# Patient Record
Sex: FEMALE | Race: BLACK OR AFRICAN AMERICAN | NOT HISPANIC OR LATINO | Employment: FULL TIME | URBAN - METROPOLITAN AREA
[De-identification: names, ages, dates, MRNs, and addresses within clinical notes are randomized per-mention and may not be internally consistent; named-entity substitution may affect disease eponyms.]

---

## 2016-08-23 LAB — PAP-ABSTRACT: NORMAL

## 2017-03-31 ENCOUNTER — HOME CARE/HOSPICE - HEALTHEAST (OUTPATIENT)
Dept: HOME HEALTH SERVICES | Facility: HOME HEALTH | Age: 22
End: 2017-03-31

## 2017-05-09 ENCOUNTER — OFFICE VISIT (OUTPATIENT)
Dept: OBGYN | Facility: OTHER | Age: 22
End: 2017-05-09
Payer: COMMERCIAL

## 2017-05-09 VITALS — WEIGHT: 117.5 LBS | SYSTOLIC BLOOD PRESSURE: 120 MMHG | HEART RATE: 72 BPM | DIASTOLIC BLOOD PRESSURE: 88 MMHG

## 2017-05-09 DIAGNOSIS — Z20.2 EXPOSURE TO STD: ICD-10-CM

## 2017-05-09 DIAGNOSIS — Z30.011 ENCOUNTER FOR INITIAL PRESCRIPTION OF CONTRACEPTIVE PILLS: ICD-10-CM

## 2017-05-09 DIAGNOSIS — Z30.012 EMERGENCY CONTRACEPTION: ICD-10-CM

## 2017-05-09 RX ORDER — AZITHROMYCIN 500 MG/1
1000 TABLET, FILM COATED ORAL ONCE
Qty: 2 TABLET | Refills: 0 | Status: SHIPPED | OUTPATIENT
Start: 2017-05-09 | End: 2017-05-09

## 2017-05-09 RX ORDER — NORETHINDRONE ACETATE AND ETHINYL ESTRADIOL 1MG-20(21)
1 KIT ORAL DAILY
Qty: 84 TABLET | Refills: 3 | Status: SHIPPED | OUTPATIENT
Start: 2017-05-09 | End: 2017-10-05

## 2017-05-09 RX ORDER — LEVONORGESTREL 1.5 MG/1
1.5 TABLET ORAL ONCE
Qty: 1 TABLET | Refills: 1 | Status: SHIPPED | OUTPATIENT
Start: 2017-05-09 | End: 2017-10-05

## 2017-05-09 RX ORDER — PRENATAL VIT/IRON FUM/FOLIC AC 27MG-0.8MG
1 TABLET ORAL DAILY
COMMUNITY

## 2017-05-09 NOTE — NURSING NOTE
Chief Complaint   Patient presents with     Post Partum Exam     patient would like Plan B and STD testing       Initial /88 (BP Location: Right arm, Patient Position: Chair, Cuff Size: Adult Regular)  Pulse 72  Wt 117 lb 8 oz (53.3 kg)  Breastfeeding? Yes There is no height or weight on file to calculate BMI.  Medication Reconciliation: complete   Yoli Richard CMA

## 2017-05-09 NOTE — PROGRESS NOTES
SUBJECTIVE: Cole Pina  is here for a 6-week postpartum checkup.  Had intercourse with out a condom with someone who is not her partner last night.  Worried about STI and pregnancy  Patient Active Problem List   Diagnosis     Encounter for initial prescription of contraceptive pills     Past Medical History:   Diagnosis Date     Anemia      Current Outpatient Prescriptions   Medication Sig Dispense Refill     Prenatal Vit-Fe Fumarate-FA (PRENATAL MULTIVITAMIN  PLUS IRON) 27-0.8 MG TABS per tablet Take 1 tablet by mouth daily       norethindrone-ethinyl estradiol (JUNEL FE 1/20) 1-20 MG-MCG per tablet Take 1 tablet by mouth daily 84 tablet 3     levonorgestrel (PLAN B ONE-STEP) 1.5 MG TABS tablet Take 1 tablet (1.5 mg) by mouth once for 1 dose 1 tablet 1     azithromycin (ZITHROMAX) 500 MG tablet Take 2 tablets (1,000 mg) by mouth once for 1 dose 2 tablet 0     ROS:  Delivery date was 3/30/2017. She had a  of a viable girl, weight 6 pounds 11 oz., with no complications.   She is  breast feeding and formula without problems  and no signs of mastitis. Bleeding  has stopped and there are no signs or symptoms of endometritis.  Patient  is screened for postpartum depression and is  negative. Last PHQ-9 score on record= 0  Denies stress incontinence.    No constipation, pain or bleeding with bowel movements.       EXAM:  Neck:  Euthyroid without masses or nodules.  Heart:  RRR without murmur.  Lungs: Clear to auscultation.  Abd: Without masses or tenderness, no organomegaly, diastisis 1 fingerbreadth.  Uterus not palpable  Adnexa with out masses or tenderness.   Rectum:  Deferred      ASSESSMENT:   Normal postpartum exam.    PLAN:  (Z39.2) Routine postpartum follow-up  (primary encounter diagnosis)  Comment:   Plan:     (Z30.011) Encounter for initial prescription of contraceptive pills  Comment:   Plan: norethindrone-ethinyl estradiol (JUNEL FE 1/20)        1-20 MG-MCG per tablet   Has used pills before and denies  contraindications         (Z30.9) Emergency contraception  Comment:   Plan: levonorgestrel (PLAN B ONE-STEP) 1.5 MG TABS         tablet            (Z20.2) Exposure to STD  Comment:   Plan: azithromycin (ZITHROMAX) 500 MG tablet  Recommend to return for testing in 2-3 months            Encouraged  50 Kegals a day 25 fast and 25 slow release.   Signs, symptoms and preventative measures for mastitis were discussed and the patient will call with aches, fever of 101 or higher and flu like symptoms.  Signs and symptoms of post partum depression were discussed and she will contact this office or other health care provider as appropriate.  Contraception is LISA

## 2017-05-09 NOTE — MR AVS SNAPSHOT
After Visit Summary   5/9/2017    Cole Pina    MRN: [id number]           Patient Information     Date Of Birth          1995        Visit Information        Provider Department      5/9/2017 1:30 PM Mary Martinez APRN CNM [facility]        Today's Diagnoses     Routine postpartum follow-up    -  1    Encounter for initial prescription of contraceptive pills        Emergency contraception        Exposure to STD           Follow-ups after your visit        Who to contact     If you have questions or need follow up information about today's clinic visit or your schedule please contact [facility] directly at [phone].  Normal or non-critical lab and imaging results will be communicated to you by MyChart, letter or phone within 4 business days after the clinic has received the results. If you do not hear from us within 7 days, please contact the clinic through [employer] or phone. If you have a critical or abnormal lab result, we will notify you by phone as soon as possible.  Submit refill requests through [employer] or call your pharmacy and they will forward the refill request to us. Please allow 3 business days for your refill to be completed.          Additional Information About Your Visit        MyChart Information     Opt[employer] gives you secure access to your electronic health record. If you see a primary care provider, you can also send messages to your care team and make appointments. If you have questions, please call your primary care clinic.  If you do not have a primary care provider, please call [phone] and they will assist you.        Care EveryWhere ID     This is your Care EveryWhere ID. This could be used by other organizations to access your [facility] medical records  [id number]        Your Vitals Were     Pulse Breastfeeding?                72 Yes           Blood Pressure from Last 3 Encounters:   05/09/17 120/88    Weight from Last 3  Encounters:   05/09/17 117 lb 8 oz (53.3 kg)              Today, you had the following     No orders found for display         Today's Medication Changes          These changes are accurate as of: 5/9/17  2:13 PM.  If you have any questions, ask your nurse or doctor.               Start taking these medicines.        Dose/Directions    azithromycin 500 MG tablet   Commonly known as:  ZITHROMAX   Used for:  Exposure to STD   Started by:  Mary Martinez APRN CNM        Dose:  1000 mg   Take 2 tablets (1,000 mg) by mouth once for 1 dose   Quantity:  2 tablet   Refills:  0       levonorgestrel 1.5 MG Tabs tablet   Commonly known as:  PLAN B ONE-STEP   Used for:  Emergency contraception   Started by:  Mary Martinez APRN CNM        Dose:  1.5 mg   Take 1 tablet (1.5 mg) by mouth once for 1 dose   Quantity:  1 tablet   Refills:  1       norethindrone-ethinyl estradiol 1-20 MG-MCG per tablet   Commonly known as:  JUNEL FE 1/20   Used for:  Encounter for initial prescription of contraceptive pills   Started by:  Mary Martinez APRN CNM        Dose:  1 tablet   Take 1 tablet by mouth daily   Quantity:  84 tablet   Refills:  3            Where to get your medicines      These medications were sent to [facility] [address] [facility] [address] [facility] AT [facility] [address] [facility] [address]  [address] [facility] [address]     Phone:  [phone]     azithromycin 500 MG tablet    levonorgestrel 1.5 MG Tabs tablet    norethindrone-ethinyl estradiol 1-20 MG-MCG per tablet                Primary Care Provider    None Specified       No primary provider on file.        Thank you!     Thank you for choosing [facility]  for your care. Our goal is always to provide you with excellent care. Hearing back from our patients is one way we can continue to improve our services. Please take a few minutes to complete the written survey that you may receive in the mail after your visit with us. Thank  you!             Your Updated Medication List - Protect others around you: Learn how to safely use, store and throw away your medicines at www.disposemymeds.org.          This list is accurate as of: 5/9/17  2:13 PM.  Always use your most recent med list.                   Brand Name Dispense Instructions for use    azithromycin 500 MG tablet    ZITHROMAX    2 tablet    Take 2 tablets (1,000 mg) by mouth once for 1 dose       levonorgestrel 1.5 MG Tabs tablet    PLAN B ONE-STEP    1 tablet    Take 1 tablet (1.5 mg) by mouth once for 1 dose       norethindrone-ethinyl estradiol 1-20 MG-MCG per tablet    JUNEL FE 1/20    84 tablet    Take 1 tablet by mouth daily       prenatal multivitamin  plus iron 27-0.8 MG Tabs per tablet      Take 1 tablet by mouth daily

## 2017-05-10 ASSESSMENT — PATIENT HEALTH QUESTIONNAIRE - PHQ9: SUM OF ALL RESPONSES TO PHQ QUESTIONS 1-9: 0

## 2017-05-24 ENCOUNTER — TELEPHONE (OUTPATIENT)
Dept: OBGYN | Facility: OTHER | Age: 22
End: 2017-05-24

## 2017-05-24 NOTE — TELEPHONE ENCOUNTER
Cole Pina is a 22 year old female who calls with vaginal bleeding, recently stated BC pill.    NURSING ASSESSMENT:  Description:  ***  Onset/duration:  ***  Pain scale (0-10)   { :356126}  LMP/preg/breast feeding:  ***  Last exam/Treatment:  ***  Allergies: No Known Allergies    NURSING PLAN: { :517037}    RECOMMENDED DISPOSITION:  { :623528}  Will comply with recommendation: { :422645}  If further questions/concerns or if symptoms do not improve, worsen or new symptoms develop, call your PCP or [facility] as soon as possible.    NOTES:  Disposition was determined by the first positive assessment question, therefore all previous assessment questions were negative    Guideline used:  { :304368}    Lizz Georges RN, BSN

## 2017-05-24 NOTE — TELEPHONE ENCOUNTER
Started birth control pill and now after two days got period again. For last 2 weeks  Every 3 hours would change pad/tampon. Having some cramps.  No dizziness, no headache.,  Breastfeeding 2x daily.    Advised she follow up in clinic.    Appointment made for next available for patient.  Advised her if bleeding worsens or weakness /dizzines develops she needs to be seen in ED.  She agreed to plan.    RECOMMENDED DISPOSITION:  See in 24 hours - unable to be seen today or tomorrow does not have car.  Will comply with recommendation: no -    If further questions/concerns or if Sx do not improve, worsen or new Sx develop, call your PCP or [facility] as soon as possible.    NOTES:  Disposition was determined by the first positive assessment question, therefore all previous assessment questions were negative.     Guideline used:abnormal bleeding in women of reproductive age  Telephone Triage for Obstetrics and Gynecology, Urszula Willingham and Malka Day RN

## 2017-05-24 NOTE — TELEPHONE ENCOUNTER
Reason for call:  Symptom  Reason for call:  Patient reporting a symptom    Symptom or request: period    Duration (how long have symptoms been present): 2 weeks    Have you been treated for this before? No    Additional comments: patient started on pill at post partum appt and after two weeks started to have her period prior to the end of the month as told. Patient would like to discuss     Phone Number patient can be reached at:  Other phone number:  [phone]    Best Time:  Before 3 pm, after 3 leave a detailed message    Can we leave a detailed message on this number:  YES    Call taken on 5/24/2017 at 12:24 PM by Radha Yun

## 2017-05-30 ENCOUNTER — OFFICE VISIT (OUTPATIENT)
Dept: OBGYN | Facility: OTHER | Age: 22
End: 2017-05-30
Payer: COMMERCIAL

## 2017-05-30 VITALS — HEART RATE: 68 BPM | WEIGHT: 116.5 LBS | SYSTOLIC BLOOD PRESSURE: 102 MMHG | DIASTOLIC BLOOD PRESSURE: 70 MMHG

## 2017-05-30 DIAGNOSIS — N92.0 MENORRHAGIA WITH REGULAR CYCLE: Primary | ICD-10-CM

## 2017-05-30 PROCEDURE — 99213 OFFICE O/P EST LOW 20 MIN: CPT | Performed by: ADVANCED PRACTICE MIDWIFE

## 2017-05-30 NOTE — MR AVS SNAPSHOT
After Visit Summary   5/30/2017    Cole Pina    MRN: [id number]           Patient Information     Date Of Birth          1995        Visit Information        Provider Department      5/30/2017 1:00 PM Mary Martinez APRN CNM [facility]        Today's Diagnoses     Menorrhagia with regular cycle    -  1       Follow-ups after your visit        Follow-up notes from your care team     Return if symptoms worsen or fail to improve.      Who to contact     If you have questions or need follow up information about today's clinic visit or your schedule please contact [facility] directly at [phone].  Normal or non-critical lab and imaging results will be communicated to you by dentalDoctorshart, letter or phone within 4 business days after the clinic has received the results. If you do not hear from us within 7 days, please contact the clinic through dentalDoctorshart or phone. If you have a critical or abnormal lab result, we will notify you by phone as soon as possible.  Submit refill requests through [email or web address] or call your pharmacy and they will forward the refill request to us. Please allow 3 business days for your refill to be completed.          Additional Information About Your Visit        MyChart Information     [email or web address] gives you secure access to your electronic health record. If you see a primary care provider, you can also send messages to your care team and make appointments. If you have questions, please call your primary care clinic.  If you do not have a primary care provider, please call [phone] and they will assist you.        Care EveryWhere ID     This is your Care EveryWhere ID. This could be used by other organizations to access your [facility] medical records  [id number]        Your Vitals Were     Pulse Last Period Breastfeeding?             68 05/11/2017 (Exact Date) Yes          Blood Pressure from Last 3 Encounters:   05/30/17 102/70   05/09/17 120/88     Weight from Last 3 Encounters:   05/30/17 116 lb 8 oz (52.8 kg)   05/09/17 117 lb 8 oz (53.3 kg)              We Performed the Following     ABSTRACT PAP-NO CHARGE        Primary Care Provider    Fvl None       No address on file        Thank you!     Thank you for choosing [facility]  for your care. Our goal is always to provide you with excellent care. Hearing back from our patients is one way we can continue to improve our services. Please take a few minutes to complete the written survey that you may receive in the mail after your visit with us. Thank you!             Your Updated Medication List - Protect others around you: Learn how to safely use, store and throw away your medicines at www.disposemymeds.org.          This list is accurate as of: 5/30/17  2:00 PM.  Always use your most recent med list.                   Brand Name Dispense Instructions for use    levonorgestrel 1.5 MG Tabs tablet    PLAN B ONE-STEP    1 tablet    Take 1 tablet (1.5 mg) by mouth once for 1 dose       norethindrone-ethinyl estradiol 1-20 MG-MCG per tablet    JUNEL FE 1/20    84 tablet    Take 1 tablet by mouth daily       prenatal multivitamin  plus iron 27-0.8 MG Tabs per tablet      Take 1 tablet by mouth daily

## 2017-05-30 NOTE — Clinical Note
Can you do this woman's pap Gogo,  My thought is that she needs a colp.  Her pap is in [facility] from [facility]. Thanks

## 2017-05-30 NOTE — NURSING NOTE
Chief Complaint   Patient presents with     Abnormal Uterine Bleeding     follow up on long/heavy period       Initial /70 (BP Location: Left arm, Patient Position: Chair, Cuff Size: Adult Regular)  Pulse 68  Wt 116 lb 8 oz (52.8 kg)  LMP 05/11/2017 (Exact Date)  Breastfeeding? Yes There is no height or weight on file to calculate BMI.  Medication Reconciliation: complete   Yoli Richard CMA

## 2017-05-30 NOTE — PROGRESS NOTES
Cole Pina is a 22 year old who presents to the clinic for evaluation and discussion of menses.   Her baby is about 2 months old.    See previous note for specifics.   Took plan B then came in for STI screen and COCs.   Started COCs and then 2 days later had a heavy period that lasted almost two weeks and was heavy and crampy.  Continued to take pills as directed.       Histories reviewed and updated  Past Medical History:   Diagnosis Date     Anemia      Papanicolaou smear of cervix with low grade squamous intraepithelial lesion (LGSIL) 8/23/2016     Past Surgical History:   Procedure Laterality Date     HERNIA REPAIR       Social History     Social History     Marital status: Single     Spouse name: N/A     Number of children: N/A     Years of education: N/A     Occupational History     Not on file.     Social History Main Topics     Smoking status: Former Smoker     Types: Cigarettes     Smokeless tobacco: Not on file     Alcohol use Yes     Drug use: No     Sexual activity: Yes     Partners: Male     Birth control/ protection: None     Other Topics Concern     Parent/Sibling W/ Cabg, Mi Or Angioplasty Before 65f 55m? No     Social History Narrative     Family History   Problem Relation Age of Onset     Anemia Mother      Anemia Maternal Grandmother            CONSTITUTIONAL: Healthy, alert, in no apparent distress.  GI: NEGATIVE  : see above    EXAM:  /70 (BP Location: Left arm, Patient Position: Chair, Cuff Size: Adult Regular)  Pulse 68  Wt 116 lb 8 oz (52.8 kg)  LMP 05/11/2017 (Exact Date)  Breastfeeding? Yes          ASSESSMENT/PLAN:    (N92.0) Menorrhagia with regular cycle  (primary encounter diagnosis)  Comment:   Plan:     Probably normal first menses post partum exacerbated by Plan B.   Discussed abnormal pap and need for repeat in August of thie year       Reviewed COCs bleeding pattern and to call with bleeding that is abnormal in the 4 th pack.   Visit length 20 min with >50% spent in  counseling regarding pills, bleeding , pap.  Time noted does not include any time required to complete procedures. '

## 2017-10-03 NOTE — PROGRESS NOTES
SUBJECTIVE:                                                    Cole Pina is a 22 year old female who presents to clinic today for the following health issues:    Declines flu vaccine   HPI   Answers for HPI/ROS submitted by the patient on 10/5/2017   If you checked off any problems, how difficult have these problems made it for you to do your work, take care of things at home, or get along with other people?: Very difficult  PHQ9 TOTAL SCORE: 17  JUJU 7 TOTAL SCORE: 19      Abnormal Mood Symptoms  Onset: June July     Description:   Depression: no   Anxiety: YES    Accompanying Signs & Symptoms:  Still participating in activities that you used to enjoy: yes and no   Fatigue: YES  Irritability: YES  Difficulty concentrating: YES  Changes in appetite: YES  Problems with sleep: YES- very hard to sleep       Thoughts of hurting yourself or others: none    History:   Recent stress: no   Prior depression hospitalization: None  Family history of depression: no   Family history of anxiety: no     Precipitating factors:   Alcohol/drug use: no     Alleviating factors:  Therapy     Therapies Tried and outcome: therapy       Problem list and histories reviewed & adjusted, as indicated.  Additional history: as documented      Patient Active Problem List   Diagnosis     Encounter for initial prescription of contraceptive pills     Papanicolaou smear of cervix with low grade squamous intraepithelial lesion (LGSIL)     Severe single current episode of major depressive disorder, without psychotic features (H)     JUJU (generalized anxiety disorder)     Past Surgical History:   Procedure Laterality Date     HERNIA REPAIR         Social History   Substance Use Topics     Smoking status: Former Smoker     Types: Cigarettes     Smokeless tobacco: Never Used     Alcohol use Yes     Family History   Problem Relation Age of Onset     Anemia Mother      Anemia Maternal Grandmother          Current Outpatient Prescriptions   Medication  "Sig Dispense Refill     sertraline (ZOLOFT) 50 MG tablet Take 1/2 tablet (25 mg) for 1-2 weeks, then increase to 1 tablet orally daily 30 tablet 1     norethindrone-ethinyl estradiol (JUNEL FE 1/20) 1-20 MG-MCG per tablet Take 1 tablet by mouth daily 84 tablet 3     Prenatal Vit-Fe Fumarate-FA (PRENATAL MULTIVITAMIN  PLUS IRON) 27-0.8 MG TABS per tablet Take 1 tablet by mouth daily       No Known Allergies  BP Readings from Last 3 Encounters:   10/05/17 98/62   05/30/17 102/70   05/09/17 120/88    Wt Readings from Last 3 Encounters:   10/05/17 109 lb (49.4 kg)   05/30/17 116 lb 8 oz (52.8 kg)   05/09/17 117 lb 8 oz (53.3 kg)                  Labs reviewed in EPIC        ROS:  Constitutional, HEENT, cardiovascular, pulmonary, gi and gu systems are negative, except as otherwise noted.      OBJECTIVE:   BP 98/62  Pulse 88  Temp 97.8  F (36.6  C) (Temporal)  Resp 16  Ht 5' 2.6\" (1.59 m)  Wt 109 lb (49.4 kg)  SpO2 100%  BMI 19.56 kg/m2  Body mass index is 19.56 kg/(m^2).   Physical Exam   Constitutional: She is oriented to person, place, and time. She appears well-developed and well-nourished.   HENT:   Head: Normocephalic and atraumatic.   Cardiovascular: Normal rate and regular rhythm.    Pulmonary/Chest: Effort normal and breath sounds normal.   Neurological: She is alert and oriented to person, place, and time.   Psychiatric: She has a normal mood and affect.         Diagnostic Test Results:  none     ASSESSMENT/PLAN:     Problem List Items Addressed This Visit        SPECIALTY PROBLEM LIST    Encounter for initial prescription of contraceptive pills    Relevant Medications    norethindrone-ethinyl estradiol (JUNEL FE 1/20) 1-20 MG-MCG per tablet       Other    Severe single current episode of major depressive disorder, without psychotic features (H) - Primary     New diagnosis  Stressors- relationship problems, Recent child birth and PTSD  Has therapy at [facility]  Trial Zoloft  Recheck in 1 month   "       Relevant Medications    sertraline (ZOLOFT) 50 MG tablet             Myra Harkins MD  [facility]

## 2017-10-05 ENCOUNTER — OFFICE VISIT (OUTPATIENT)
Dept: FAMILY MEDICINE | Facility: OTHER | Age: 22
End: 2017-10-05
Payer: COMMERCIAL

## 2017-10-05 VITALS
SYSTOLIC BLOOD PRESSURE: 98 MMHG | WEIGHT: 109 LBS | DIASTOLIC BLOOD PRESSURE: 62 MMHG | RESPIRATION RATE: 16 BRPM | TEMPERATURE: 97.8 F | BODY MASS INDEX: 19.31 KG/M2 | HEART RATE: 88 BPM | HEIGHT: 63 IN | OXYGEN SATURATION: 100 %

## 2017-10-05 DIAGNOSIS — Z30.011 ENCOUNTER FOR INITIAL PRESCRIPTION OF CONTRACEPTIVE PILLS: ICD-10-CM

## 2017-10-05 DIAGNOSIS — F32.2 SEVERE SINGLE CURRENT EPISODE OF MAJOR DEPRESSIVE DISORDER, WITHOUT PSYCHOTIC FEATURES (H): Primary | ICD-10-CM

## 2017-10-05 PROBLEM — F41.1 GAD (GENERALIZED ANXIETY DISORDER): Status: ACTIVE | Noted: 2017-10-05

## 2017-10-05 PROCEDURE — 99213 OFFICE O/P EST LOW 20 MIN: CPT | Performed by: FAMILY MEDICINE

## 2017-10-05 RX ORDER — NORETHINDRONE ACETATE AND ETHINYL ESTRADIOL 1MG-20(21)
1 KIT ORAL DAILY
Qty: 84 TABLET | Refills: 3 | Status: SHIPPED | OUTPATIENT
Start: 2017-10-05

## 2017-10-05 ASSESSMENT — ANXIETY QUESTIONNAIRES
4. TROUBLE RELAXING: NEARLY EVERY DAY
6. BECOMING EASILY ANNOYED OR IRRITABLE: NEARLY EVERY DAY
3. WORRYING TOO MUCH ABOUT DIFFERENT THINGS: NEARLY EVERY DAY
2. NOT BEING ABLE TO STOP OR CONTROL WORRYING: NEARLY EVERY DAY
GAD7 TOTAL SCORE: 19
GAD7 TOTAL SCORE: 19
7. FEELING AFRAID AS IF SOMETHING AWFUL MIGHT HAPPEN: MORE THAN HALF THE DAYS
5. BEING SO RESTLESS THAT IT IS HARD TO SIT STILL: MORE THAN HALF THE DAYS
7. FEELING AFRAID AS IF SOMETHING AWFUL MIGHT HAPPEN: MORE THAN HALF THE DAYS
GAD7 TOTAL SCORE: 19
1. FEELING NERVOUS, ANXIOUS, OR ON EDGE: NEARLY EVERY DAY

## 2017-10-05 ASSESSMENT — PATIENT HEALTH QUESTIONNAIRE - PHQ9
10. IF YOU CHECKED OFF ANY PROBLEMS, HOW DIFFICULT HAVE THESE PROBLEMS MADE IT FOR YOU TO DO YOUR WORK, TAKE CARE OF THINGS AT HOME, OR GET ALONG WITH OTHER PEOPLE: VERY DIFFICULT
SUM OF ALL RESPONSES TO PHQ QUESTIONS 1-9: 17
SUM OF ALL RESPONSES TO PHQ QUESTIONS 1-9: 17

## 2017-10-05 ASSESSMENT — PAIN SCALES - GENERAL: PAINLEVEL: NO PAIN (0)

## 2017-10-05 NOTE — NURSING NOTE
"Chief Complaint   Patient presents with     Depression     Panel Management     haroon, height, tdap, hpv, flu, chlamydia, pap       Initial BP 98/62  Pulse 88  Temp 97.8  F (36.6  C) (Temporal)  Resp 16  Ht 5' 2.6\" (1.59 m)  Wt 109 lb (49.4 kg)  SpO2 100%  BMI 19.56 kg/m2 Estimated body mass index is 19.56 kg/(m^2) as calculated from the following:    Height as of this encounter: 5' 2.6\" (1.59 m).    Weight as of this encounter: 109 lb (49.4 kg).  Medication Reconciliation: complete   Daria Payton MA      "

## 2017-10-05 NOTE — MR AVS SNAPSHOT
After Visit Summary   10/5/2017    Cole Pina    MRN: [id number]           Patient Information     Date Of Birth          1995        Visit Information        Provider Department      10/5/2017 6:20 PM Myra Harkins MD [facility]        Today's Diagnoses     Severe single current episode of major depressive disorder, without psychotic features (H)    -  1    Screening examination for venereal disease        Screening for malignant neoplasm of cervix        Need for HPV vaccine        Need for prophylactic vaccination and inoculation against influenza        Need for prophylactic vaccination with tetanus-diphtheria (TD)          Care Instructions      Selective Serotonin Reuptake Inhibitors  Your healthcare provider prescribed a selective serotonin reuptake inhibitor (SSRI) for you. An SSRI is an antidepressant. SSRIs help reduce the extreme sadness, hopelessness, and lack of interest in life that are typical in people with depression. SSRIs are also used to treat panic disorder and obsessive compulsive disorder (OCD).     The name of my SSRI is ____________________________________________.   Guidelines for use    Follow the fact sheet that came with your medicine. It tells you when and how to take your medicine. Ask for a sheet if you didn t get one.    Before starting your medicine, tell your healthcare provider if you have:    Manic depression or bipolar disorder    Kidney disease    Thyroid disease    Diabetes    Liver disease    Seizure disorders    A history or current problem with drug abuse or dependence    Tell your healthcare provider about any other medicines you are taking. This includes over-the-counter or herbal medicines.    Take your medicine exactly as directed. This medicine takes several weeks to reach its full effect. Because of this, it is important to take this medicine every day, even if you believe that it is not helping your symptoms. You may need to  take this medicine for a few months. Or you may need to take it for the rest of your life. It depends on your symptoms.    If you miss a dose, take it as soon as you remember--unless it s almost time for your next dose. In that case, skip the dose you missed. Don t take a double dose.    Take your medicine with food.    Limit alcohol intake while taking this medicine. Or if possible, don t have any alcohol at all while taking this medicine.    Don t take an SSRI if you are currently taking a monoamine oxidase inhibitor (MAO inhibitor).    Don t stop taking your medicine without talking with your healthcare provider. If you wish to stop taking your SSRI, your healthcare provider will need to help you reduce the medicine slowly.    Before using new over-the-counter medicines, check with the pharmacist to be sure it will not interact with the SSRI.    Don t share your medicine or use another person's medicine, even if it is the same medicine and dose. Check with your provider if you have trouble affording your prescription.  Possible side effects  Tell your healthcare provider if you have any of these side effects. Don t stop taking the medicine until your healthcare provider tells you to. Mild side effects include:    Anxiety    Trouble sleeping    Nausea    Diarrhea    Headaches    Loss of sex drive or problems with orgasm    Sweating     When to call your healthcare provider  Call your healthcare provider right away if you have any of the following:    Increased feelings of depression    Unusual joint or muscle pain    Trouble breathing    Shaking chills    Excessive excitement    Trouble controlling your emotions or actions    Skin rash    Hives    Tremors   Date Last Reviewed: 5/1/2017 2000-2017 [employer]. [address] [place] [address], [place] [address]. All rights reserved. This information is not intended as a substitute for professional medical care. Always follow your healthcare professional's  instructions.        Sertraline tablets  What is this medicine?  SERTRALINE (SER tra rashmi) is used to treat depression. It may also be used to treat obsessive compulsive disorder, panic disorder, post-trauma stress, premenstrual dysphoric disorder (PMDD) or social anxiety.  How should I use this medicine?  Take this medicine by mouth with a glass of water. Follow the directions on the prescription label. You can take it with or without food. Take your medicine at regular intervals. Do not take your medicine more often than directed. Do not stop taking this medicine suddenly except upon the advice of your doctor. Stopping this medicine too quickly may cause serious side effects or your condition may worsen.  A special MedGuide will be given to you by the pharmacist with each prescription and refill. Be sure to read this information carefully each time.  Talk to your pediatrician regarding the use of this medicine in children. While this drug may be prescribed for children as young as 7 years for selected conditions, precautions do apply.  What side effects may I notice from receiving this medicine?  Side effects that you should report to your doctor or health care professional as soon as possible:    allergic reactions like skin rash, itching or hives, swelling of the face, lips, or tongue    black or bloody stools, blood in the urine or vomit    fast, irregular heartbeat    feeling faint or lightheaded, falls    hallucination, loss of contact with reality    seizures    suicidal thoughts or other mood changes    unusual bleeding or bruising    unusually weak or tired    vomiting  Side effects that usually do not require medical attention (report to your doctor or health care professional if they continue or are bothersome):    change in appetite    change in sex drive or performance    diarrhea    increased sweating    indigestion, nausea    tremors  What may interact with this medicine?  Do not take this medicine  with any of the following medications:    certain medicines for fungal infections like fluconazole, itraconazole, ketoconazole, posaconazole, voriconazole    cisapride    disulfiram    dofetilide    linezolid    MAOIs like Carbex, Eldepryl, Marplan, Nardil, and Parnate    metronidazole    methylene blue (injected into a vein)    pimozide    thioridazine    ziprasidone  This medicine may also interact with the following medications:    alcohol    aspirin and aspirin-like medicines    certain medicines for depression, anxiety, or psychotic disturbances    certain medicines for irregular heart beat like flecainide, propafenone    certain medicines for migraine headaches like almotriptan, eletriptan, frovatriptan, naratriptan, rizatriptan, sumatriptan, zolmitriptan    certain medicines for sleep    certain medicines for seizures like carbamazepine, valproic acid, phenytoin    certain medicines that treat or prevent blood clots like warfarin, enoxaparin, dalteparin    cimetidine    digoxin    diuretics    fentanyl    furazolidone    isoniazid    lithium    NSAIDs, medicines for pain and inflammation, like ibuprofen or naproxen    other medicines that prolong the QT interval (cause an abnormal heart rhythm)    procarbazine    rasagiline    supplements like Calvin's wort, kava kava, valerian    tolbutamide    tramadol    tryptophan  What if I miss a dose?  If you miss a dose, take it as soon as you can. If it is almost time for your next dose, take only that dose. Do not take double or extra doses.  Where should I keep my medicine?  Keep out of the reach of children.  Store at room temperature between 15 and 30 degrees C (59 and 86 degrees F). Throw away any unused medicine after the expiration date.  What should I tell my health care provider before I take this medicine?  They need to know if you have any of these conditions:    bipolar disorder or a family history of bipolar disorder    diabetes    glaucoma    heart  disease    high blood pressure    history of irregular heartbeat    history of low levels of calcium, magnesium, or potassium in the blood    if you often drink alcohol    liver disease    receiving electroconvulsive therapy    seizures    suicidal thoughts, plans, or attempt; a previous suicide attempt by you or a family member    thyroid disease    an unusual or allergic reaction to sertraline, other medicines, foods, dyes, or preservatives    pregnant or trying to get pregnant    breast-feeding  What should I watch for while using this medicine?  Tell your doctor if your symptoms do not get better or if they get worse. Visit your doctor or health care professional for regular checks on your progress. Because it may take several weeks to see the full effects of this medicine, it is important to continue your treatment as prescribed by your doctor.  Patients and their families should watch out for new or worsening thoughts of suicide or depression. Also watch out for sudden changes in feelings such as feeling anxious, agitated, panicky, irritable, hostile, aggressive, impulsive, severely restless, overly excited and hyperactive, or not being able to sleep. If this happens, especially at the beginning of treatment or after a change in dose, call your health care professional.  You may get drowsy or dizzy. Do not drive, use machinery, or do anything that needs mental alertness until you know how this medicine affects you. Do not stand or sit up quickly, especially if you are an older patient. This reduces the risk of dizzy or fainting spells. Alcohol may interfere with the effect of this medicine. Avoid alcoholic drinks.  Your mouth may get dry. Chewing sugarless gum or sucking hard candy, and drinking plenty of water may help. Contact your doctor if the problem does not go away or is severe.  NOTE:This sheet is a summary. It may not cover all possible information. If you have questions about this medicine, talk to  "your doctor, pharmacist, or health care provider. Copyright  2017 [employer]                Follow-ups after your visit        Follow-up notes from your care team     Return in about 4 weeks (around 2017).      Who to contact     If you have questions or need follow up information about today's clinic visit or your schedule please contact [facility] directly at [phone].  Normal or non-critical lab and imaging results will be communicated to you by MyChart, letter or phone within 4 business days after the clinic has received the results. If you do not hear from us within 7 days, please contact the clinic through [employer] or phone. If you have a critical or abnormal lab result, we will notify you by phone as soon as possible.  Submit refill requests through [employer] or call your pharmacy and they will forward the refill request to us. Please allow 3 business days for your refill to be completed.          Additional Information About Your Visit        MyChart Information     [employer] lets you send messages to your doctor, view your test results, renew your prescriptions, schedule appointments and more. To sign up, go to [email or web address] . Click on \"Log in\" on the left side of the screen, which will take you to the Welcome page. Then click on \"Sign up Now\" on the right side of the page.     You will be asked to enter the access code listed below, as well as some personal information. Please follow the directions to create your username and password.     Your access code is: [email or web address]  Expires: 1/3/2018  6:52 PM     Your access code will  in 90 days. If you need help or a new code, please call your [place] clinic or [phone].        Care EveryWhere ID     This is your Care EveryWhere ID. This could be used by other organizations to access your [place] medical records  [id number]        Your Vitals Were     Pulse Temperature Respirations Height Pulse Oximetry BMI (Body " "Mass Index)    88 97.8  F (36.6  C) (Temporal) 16 5' 2.6\" (1.59 m) 100% 19.56 kg/m2       Blood Pressure from Last 3 Encounters:   10/05/17 98/62   05/30/17 102/70   05/09/17 120/88    Weight from Last 3 Encounters:   10/05/17 109 lb (49.4 kg)   05/30/17 116 lb 8 oz (52.8 kg)   05/09/17 117 lb 8 oz (53.3 kg)              Today, you had the following     No orders found for display         Today's Medication Changes          These changes are accurate as of: 10/5/17  6:53 PM.  If you have any questions, ask your nurse or doctor.               Start taking these medicines.        Dose/Directions    sertraline 50 MG tablet   Commonly known as:  ZOLOFT   Used for:  Severe single current episode of major depressive disorder, without psychotic features (H)   Started by:  Myra Harkins MD        Take 1/2 tablet (25 mg) for 1-2 weeks, then increase to 1 tablet orally daily   Quantity:  30 tablet   Refills:  1            Where to get your medicines      These medications were [facility] to [facility] [address] [place] [address] [facility] [address]  [address] [facility], [facility] [address]     Phone:  [phone]     sertraline 50 MG tablet                Primary Care Provider    None Specified       No primary provider on file.        Equal Access to Services     DAT SERRANO AH: Hadlala Morgan, waaxda luqadaha, qaybta kaalmada yovanny, julita morataya. So [facility] [phone].    ATENCIÓN: Si habla español, tiene a ortiz disposición servicios gratuitos de asistencia lingüística. Woody alvarado [phone].    We comply with applicable federal civil rights laws and [place] laws. We do not discriminate on the basis of race, color, national origin, age, disability, sex, sexual orientation, or gender identity.            Thank you!     Thank you for choosing [facility]  for your care. Our goal is always to provide you with excellent care. Hearing " back from our patients is one way we can continue to improve our services. Please take a few minutes to complete the written survey that you may receive in the mail after your visit with us. Thank you!             Your Updated Medication List - Protect others around you: Learn how to safely use, store and throw away your medicines at www.disposemymeds.org.          This list is accurate as of: 10/5/17  6:53 PM.  Always use your most recent med list.                   Brand Name Dispense Instructions for use Diagnosis    norethindrone-ethinyl estradiol 1-20 MG-MCG per tablet    JUNEL FE 1/20    84 tablet    Take 1 tablet by mouth daily    Encounter for initial prescription of contraceptive pills       prenatal multivitamin plus iron 27-0.8 MG Tabs per tablet      Take 1 tablet by mouth daily        sertraline 50 MG tablet    ZOLOFT    30 tablet    Take 1/2 tablet (25 mg) for 1-2 weeks, then increase to 1 tablet orally daily    Severe single current episode of major depressive disorder, without psychotic features (H)

## 2017-10-05 NOTE — PATIENT INSTRUCTIONS
Selective Serotonin Reuptake Inhibitors  Your healthcare provider prescribed a selective serotonin reuptake inhibitor (SSRI) for you. An SSRI is an antidepressant. SSRIs help reduce the extreme sadness, hopelessness, and lack of interest in life that are typical in people with depression. SSRIs are also used to treat panic disorder and obsessive compulsive disorder (OCD).     The name of my SSRI is ____________________________________________.   Guidelines for use    Follow the fact sheet that came with your medicine. It tells you when and how to take your medicine. Ask for a sheet if you didn t get one.    Before starting your medicine, tell your healthcare provider if you have:    Manic depression or bipolar disorder    Kidney disease    Thyroid disease    Diabetes    Liver disease    Seizure disorders    A history or current problem with drug abuse or dependence    Tell your healthcare provider about any other medicines you are taking. This includes over-the-counter or herbal medicines.    Take your medicine exactly as directed. This medicine takes several weeks to reach its full effect. Because of this, it is important to take this medicine every day, even if you believe that it is not helping your symptoms. You may need to take this medicine for a few months. Or you may need to take it for the rest of your life. It depends on your symptoms.    If you miss a dose, take it as soon as you remember--unless it s almost time for your next dose. In that case, skip the dose you missed. Don t take a double dose.    Take your medicine with food.    Limit alcohol intake while taking this medicine. Or if possible, don t have any alcohol at all while taking this medicine.    Don t take an SSRI if you are currently taking a monoamine oxidase inhibitor (MAO inhibitor).    Don t stop taking your medicine without talking with your healthcare provider. If you wish to stop taking your SSRI, your healthcare provider will need to  help you reduce the medicine slowly.    Before using new over-the-counter medicines, check with the pharmacist to be sure it will not interact with the SSRI.    Don t share your medicine or use another person's medicine, even if it is the same medicine and dose. Check with your provider if you have trouble affording your prescription.  Possible side effects  Tell your healthcare provider if you have any of these side effects. Don t stop taking the medicine until your healthcare provider tells you to. Mild side effects include:    Anxiety    Trouble sleeping    Nausea    Diarrhea    Headaches    Loss of sex drive or problems with orgasm    Sweating     When to call your healthcare provider  Call your healthcare provider right away if you have any of the following:    Increased feelings of depression    Unusual joint or muscle pain    Trouble breathing    Shaking chills    Excessive excitement    Trouble controlling your emotions or actions    Skin rash    Hives    Tremors   Date Last Reviewed: 5/1/2017 2000-2017 [employer]. [address] [place] [address]. All rights reserved. This information is not intended as a substitute for professional medical care. Always follow your healthcare professional's instructions.        Sertraline tablets  What is this medicine?  SERTRALINE (SER tra rashmi) is used to treat depression. It may also be used to treat obsessive compulsive disorder, panic disorder, post-trauma stress, premenstrual dysphoric disorder (PMDD) or social anxiety.  How should I use this medicine?  Take this medicine by mouth with a glass of water. Follow the directions on the prescription label. You can take it with or without food. Take your medicine at regular intervals. Do not take your medicine more often than directed. Do not stop taking this medicine suddenly except upon the advice of your doctor. Stopping this medicine too quickly may cause serious side effects or your condition  may worsen.  A special MedGuide will be given to you by the pharmacist with each prescription and refill. Be sure to read this information carefully each time.  Talk to your pediatrician regarding the use of this medicine in children. While this drug may be prescribed for children as young as 7 years for selected conditions, precautions do apply.  What side effects may I notice from receiving this medicine?  Side effects that you should report to your doctor or health care professional as soon as possible:    allergic reactions like skin rash, itching or hives, swelling of the face, lips, or tongue    black or bloody stools, blood in the urine or vomit    fast, irregular heartbeat    feeling faint or lightheaded, falls    hallucination, loss of contact with reality    seizures    suicidal thoughts or other mood changes    unusual bleeding or bruising    unusually weak or tired    vomiting  Side effects that usually do not require medical attention (report to your doctor or health care professional if they continue or are bothersome):    change in appetite    change in sex drive or performance    diarrhea    increased sweating    indigestion, nausea    tremors  What may interact with this medicine?  Do not take this medicine with any of the following medications:    certain medicines for fungal infections like fluconazole, itraconazole, ketoconazole, posaconazole, voriconazole    cisapride    disulfiram    dofetilide    linezolid    MAOIs like Carbex, Eldepryl, Marplan, Nardil, and Parnate    metronidazole    methylene blue (injected into a vein)    pimozide    thioridazine    ziprasidone  This medicine may also interact with the following medications:    alcohol    aspirin and aspirin-like medicines    certain medicines for depression, anxiety, or psychotic disturbances    certain medicines for irregular heart beat like flecainide, propafenone    certain medicines for migraine headaches like almotriptan, eletriptan,  frovatriptan, naratriptan, rizatriptan, sumatriptan, zolmitriptan    certain medicines for sleep    certain medicines for seizures like carbamazepine, valproic acid, phenytoin    certain medicines that treat or prevent blood clots like warfarin, enoxaparin, dalteparin    cimetidine    digoxin    diuretics    fentanyl    furazolidone    isoniazid    lithium    NSAIDs, medicines for pain and inflammation, like ibuprofen or naproxen    other medicines that prolong the QT interval (cause an abnormal heart rhythm)    procarbazine    rasagiline    supplements like [employer]'s wort, kava kava, valerian    tolbutamide    tramadol    tryptophan  What if I miss a dose?  If you miss a dose, take it as soon as you can. If it is almost time for your next dose, take only that dose. Do not take double or extra doses.  Where should I keep my medicine?  Keep out of the reach of children.  Store at room temperature between 15 and 30 degrees C (59 and 86 degrees F). Throw away any unused medicine after the expiration date.  What should I tell my health care provider before I take this medicine?  They need to know if you have any of these conditions:    bipolar disorder or a family history of bipolar disorder    diabetes    glaucoma    heart disease    high blood pressure    history of irregular heartbeat    history of low levels of calcium, magnesium, or potassium in the blood    if you often drink alcohol    liver disease    receiving electroconvulsive therapy    seizures    suicidal thoughts, plans, or attempt; a previous suicide attempt by you or a family member    thyroid disease    an unusual or allergic reaction to sertraline, other medicines, foods, dyes, or preservatives    pregnant or trying to get pregnant    breast-feeding  What should I watch for while using this medicine?  Tell your doctor if your symptoms do not get better or if they get worse. Visit your doctor or health care professional for regular checks on your  progress. Because it may take several weeks to see the full effects of this medicine, it is important to continue your treatment as prescribed by your doctor.  Patients and their families should watch out for new or worsening thoughts of suicide or depression. Also watch out for sudden changes in feelings such as feeling anxious, agitated, panicky, irritable, hostile, aggressive, impulsive, severely restless, overly excited and hyperactive, or not being able to sleep. If this happens, especially at the beginning of treatment or after a change in dose, call your health care professional.  You may get drowsy or dizzy. Do not drive, use machinery, or do anything that needs mental alertness until you know how this medicine affects you. Do not stand or sit up quickly, especially if you are an older patient. This reduces the risk of dizzy or fainting spells. Alcohol may interfere with the effect of this medicine. Avoid alcoholic drinks.  Your mouth may get dry. Chewing sugarless gum or sucking hard candy, and drinking plenty of water may help. Contact your doctor if the problem does not go away or is severe.  NOTE:This sheet is a summary. It may not cover all possible information. If you have questions about this medicine, talk to your doctor, pharmacist, or health care provider. Copyright  2017 [employer]

## 2017-10-05 NOTE — ASSESSMENT & PLAN NOTE
New diagnosis  Stressors- relationship problems, Recent child birth and PTSD  Has therapy at [facility]  Trial Zoloft  Recheck in 1 month

## 2017-10-06 ASSESSMENT — ANXIETY QUESTIONNAIRES: GAD7 TOTAL SCORE: 19

## 2017-10-06 ASSESSMENT — PATIENT HEALTH QUESTIONNAIRE - PHQ9: SUM OF ALL RESPONSES TO PHQ QUESTIONS 1-9: 17

## 2017-11-07 ENCOUNTER — TELEPHONE (OUTPATIENT)
Dept: FAMILY MEDICINE | Facility: OTHER | Age: 22
End: 2017-11-07

## 2017-11-07 ENCOUNTER — VIRTUAL VISIT (OUTPATIENT)
Dept: FAMILY MEDICINE | Facility: OTHER | Age: 22
End: 2017-11-07
Payer: COMMERCIAL

## 2017-11-07 DIAGNOSIS — F32.2 SEVERE SINGLE CURRENT EPISODE OF MAJOR DEPRESSIVE DISORDER, WITHOUT PSYCHOTIC FEATURES (H): ICD-10-CM

## 2017-11-07 PROCEDURE — 99441 ZZC PHYSICIAN TELEPHONE EVALUATION 5-10 MIN: CPT | Performed by: FAMILY MEDICINE

## 2017-11-07 ASSESSMENT — PATIENT HEALTH QUESTIONNAIRE - PHQ9: SUM OF ALL RESPONSES TO PHQ QUESTIONS 1-9: 2

## 2017-11-07 NOTE — MR AVS SNAPSHOT
"              After Visit Summary   2017    Cole Pina    MRN: [id number]           Patient Information     Date Of Birth          1995        Visit Information        Provider Department      2017 11:40 AM Myra Harkins MD [facility]        Today's Diagnoses     Severe single current episode of major depressive disorder, without psychotic features (H)           Follow-ups after your visit        Who to contact     If you have questions or need follow up information about today's clinic visit or your schedule please contact [facility] directly at [phone].  Normal or non-critical lab and imaging results will be communicated to you by Hunchhart, letter or phone within 4 business days after the clinic has received the results. If you do not hear from us within 7 days, please contact the clinic through Hunchhart or phone. If you have a critical or abnormal lab result, we will notify you by phone as soon as possible.  Submit refill requests through Minekey or call your pharmacy and they will forward the refill request to us. Please allow 3 business days for your refill to be completed.          Additional Information About Your Visit        MyChart Information     Minekey lets you send messages to your doctor, view your test results, renew your prescriptions, schedule appointments and more. To sign up, go to [email or web address] . Click on \"Log in\" on the left side of the screen, which will take you to the Welcome page. Then click on \"Sign up Now\" on the right side of the page.     You will be asked to enter the access code listed below, as well as some personal information. Please follow the directions to create your username and password.     Your access code is: [email or web address]  Expires: 1/3/2018  5:52 PM     Your access code will  in 90 days. If you need help or a new code, please call your [facility] or [phone].        Care EveryWhere ID     This " is your Care EveryWhere ID. This could be used by other organizations to access your [facility] medical records  [id number]         Blood Pressure from Last 3 Encounters:   10/05/17 98/62   05/30/17 102/70   05/09/17 120/88    Weight from Last 3 Encounters:   10/05/17 109 lb (49.4 kg)   05/30/17 116 lb 8 oz (52.8 kg)   05/09/17 117 lb 8 oz (53.3 kg)              Today, you had the following     No orders found for display         Today's Medication Changes          These changes are accurate as of: 11/7/17  1:50 PM.  If you have any questions, ask your nurse or doctor.               These medicines have changed or have updated prescriptions.        Dose/Directions    sertraline 50 MG tablet   Commonly known as:  ZOLOFT   This may have changed:  additional instructions   Used for:  Severe single current episode of major depressive disorder, without psychotic features (H)   Changed by:  Myra Harkins MD        Take 1.5 pills at bedtime   Quantity:  135 tablet   Refills:  0            Where to get your medicines      These medications were sent to [facility] [address] [place] [address] [facility] AT [facility] [address] [facility]  [address] [facility] [address]     Phone:  [phone]     sertraline 50 MG tablet                Primary Care Provider Office Phone # Fax #    Myra Harkins [phone]       [address] [facility] [address] [facility] [address]  [facility] [address]        Equal Access to Services     JAMA SERRANO AH: Hadii judy anguianoo Soarash, waaxda luqadaha, qaybta kaalmada adeegyada, wax montserrat morataya. So [facility] [phone].    ATENCIÓN: Si habla español, tiene a ortiz disposición servicios gratuitos de asistencia lingüística. Llame al [phone].    We comply with applicable federal civil rights laws and [place] laws. We do not discriminate on the basis of race, color, national origin, age, disability, sex, sexual orientation, or gender identity.            Thank  you!     Thank you for choosing [facility]  for your care. Our goal is always to provide you with excellent care. Hearing back from our patients is one way we can continue to improve our services. Please take a few minutes to complete the written survey that you may receive in the mail after your visit with us. Thank you!             Your Updated Medication List - Protect others around you: Learn how to safely use, store and throw away your medicines at www.disposemymeds.org.          This list is accurate as of: 11/7/17  1:50 PM.  Always use your most recent med list.                   Brand Name Dispense Instructions for use Diagnosis    norethindrone-ethinyl estradiol 1-20 MG-MCG per tablet    JUNEL FE 1/20    84 tablet    Take 1 tablet by mouth daily    Encounter for initial prescription of contraceptive pills       prenatal multivitamin plus iron 27-0.8 MG Tabs per tablet      Take 1 tablet by mouth daily        sertraline 50 MG tablet    ZOLOFT    135 tablet    Take 1.5 pills at bedtime    Severe single current episode of major depressive disorder, without psychotic features (H)

## 2017-11-07 NOTE — ASSESSMENT & PLAN NOTE
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

## 2017-11-07 NOTE — PROGRESS NOTES
"Cole Pina is a 22 year old female who is being evaluated via a telephone visit.      The patient has been notified of following:     \"This telephone visit will be conducted via a call between you and your physician/provider. We have found that certain health care needs can be provided without the need for a physical exam.  This service lets us provide the care you need with a short phone conversation.  If a prescription is necessary we can send it directly to your pharmacy.  If lab work is needed we can place an order for that and you can then stop by our lab to have the test done at a later time.    We will bill your insurance company for this service.  Please check with your medical insurance if this type of visit is covered. You may be responsible for the cost of this type of visit if insurance coverage is denied.  The typical cost is $30 (10min), $59 (11-20min) and $85 (21-30min).  Most often these visits are shorter than 10 minutes.    If during the course of the call the physician/provider feels a telephone visit is not appropriate, you will not be charged for this service.\"       Consent has been obtained for this service by 2 care team members: yes. See the scanned image in the medical record.    Cole Pina complains of  Depression (Recheck)      I have reviewed and updated the patient's Past Medical History, Social History, Family History and Medication List.    ALLERGIES  Review of patient's allergies indicates no known allergies.    Amy Stubbs CMA (AAMA)   (MA signature)    Additional provider notes:    Assessment/Plan:  Problem List Items Addressed This Visit     Severe single current episode of major depressive disorder, without psychotic features (H)     phq-9 in remission but pt thinks she still has room for improvement  Will increase dose to 75mg of zoloft  Recheck in 3 months  Spent 6 min on the phone         Relevant Medications    sertraline (ZOLOFT) 50 MG tablet            I have " reviewed the note as documented above.  This accurately captures the substance of my conversation with the patient,      Total time of call between patient and provider was 6  minutes

## 2017-11-07 NOTE — TELEPHONE ENCOUNTER
Spoke with patient regarding message below.  Amy Stubbs CMA ([facility])

## 2017-11-07 NOTE — TELEPHONE ENCOUNTER
LM for patient regarding message below.  Scheduled patient a phone visit today at 11:40am with RK if she would like to keep that or would need to reschedule.  (Patient was scheduled yesterday with WS for a phone visit, but provider was out of office.)  Amy Stubbs [facility]

## 2017-12-08 ENCOUNTER — HOSPITAL ENCOUNTER (EMERGENCY)
Facility: CLINIC | Age: 22
Discharge: HOME | End: 2017-12-08
Attending: FAMILY MEDICINE | Admitting: FAMILY MEDICINE
Payer: COMMERCIAL

## 2017-12-08 VITALS
DIASTOLIC BLOOD PRESSURE: 70 MMHG | TEMPERATURE: 98.1 F | HEART RATE: 65 BPM | SYSTOLIC BLOOD PRESSURE: 112 MMHG | OXYGEN SATURATION: 98 % | RESPIRATION RATE: 18 BRPM

## 2017-12-08 DIAGNOSIS — F43.0 ACUTE REACTION TO STRESS: ICD-10-CM

## 2017-12-08 DIAGNOSIS — F32.A DEPRESSION, UNSPECIFIED DEPRESSION TYPE: ICD-10-CM

## 2017-12-08 PROCEDURE — 99284 EMERGENCY DEPT VISIT MOD MDM: CPT | Mod: Z6 | Performed by: FAMILY MEDICINE

## 2017-12-08 PROCEDURE — 99283 EMERGENCY DEPT VISIT LOW MDM: CPT | Performed by: FAMILY MEDICINE

## 2017-12-08 NOTE — ED AVS SNAPSHOT
[facility] Emergency Department    [address] [place] [address]    Phone:  [phone]    Fax:  [phone]                                       Cole Pina   MRN: [id number]    Department:  [facility] Emergency Department   Date of Visit:  12/8/2017           Patient Information     Date Of Birth          1995        Your diagnoses for this visit were:     Acute reaction to stress     Depression        You were seen by Jessica Calvin MD.      Follow-up Information     Follow up with Your therapist In 1 week.        Follow up with [facility].    Specialty:  EMERGENCY MEDICINE    Why:  As needed    Contact information:    [address] [place] [address] [phone]    Additional information:    From [address]: Exit at [address] on [address] side of [place]. Turn right on [address]. Turn left at stoplight on [address]. [facility] will be in view two blocks ahead        Discharge Instructions       Please follow up with your therapist within the next week.    Please resume the Zoloft and take this daily.    Contact your therapist and crisis line as needed.    Return to the Emergency Department at any time if your symptoms worsen.        24 Hour Appointment Hotline       To make an appointment at any [facility] clinic, call [phone] ([phone]). If you don't have a family doctor or clinic, we will help you find one. [facility] clinics are conveniently located to serve the needs of you and your family.             Review of your medicines      Our records show that you are taking the medicines listed below. If these are incorrect, please call your family doctor or clinic.        Dose / Directions Last dose taken    norethindrone-ethinyl estradiol 1-20 MG-MCG per tablet   Commonly known as:  JUNEL FE 1/20   Dose:  1 tablet   Quantity:  84 tablet        Take 1 tablet by mouth daily   Refills:  3         "prenatal multivitamin plus iron 27-0.8 MG Tabs per tablet   Dose:  1 tablet        Take 1 tablet by mouth daily   Refills:  0        sertraline 50 MG tablet   Commonly known as:  ZOLOFT   Quantity:  135 tablet        Take 1.5 pills at bedtime   Refills:  0                Orders Needing Specimen Collection     None      Pending Results     No orders found from 2017 to 2017.            Pending Culture Results     No orders found from 2017 to 2017.            Pending Results Instructions     If you had any lab results that were not finalized at the time of your Discharge, you can call the ED Lab Result RN at [phone]. You will be contacted by this team for any positive Lab results or changes in treatment. The nurses are available 7 days a week from 10A to 6:30P.  You can leave a message 24 hours per day and they will return your call.        Thank you for choosing [facility]       Thank you for choosing [facility] for your care. Our goal is always to provide you with excellent care. Hearing back from our patients is one way we can continue to improve our services. Please take a few minutes to complete the written survey that you may receive in the mail after you visit with us. Thank you!        [employer]beam[employer]" Information     SureDone lets you send messages to your doctor, view your test results, renew your prescriptions, schedule appointments and more. To sign up, go to [email or web address] . Click on \"Log in\" on the left side of the screen, which will take you to the Welcome page. Then click on \"Sign up Now\" on the right side of the page.     You will be asked to enter the access code listed below, as well as some personal information. Please follow the directions to create your username and password.     Your access code is: [email or web address]  Expires: 1/3/2018  5:52 PM     Your access code will  in 90 days. If you need help or a new code, please call your [facility] clinic or [phone].        Care " EveryWhere ID     This is your Care EveryWhere ID. This could be used by other organizations to access your [facility] medical records  [id number]        Equal Access to Services     DAT SERRANO : Veda Morgan, angelita silva, mercy hairston, julita morataya. So [facility] [phone].    ATENCIÓN: Si habla español, tiene a ortiz disposición servicios gratuitos de asistencia lingüística. Llame al [phone].    We comply with applicable federal civil rights laws and [place] laws. We do not discriminate on the basis of race, color, national origin, age, disability, sex, sexual orientation, or gender identity.            After Visit Summary       This is your record. Keep this with you and show to your community pharmacist(s) and doctor(s) at your next visit.
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
## 2017-12-08 NOTE — ED AVS SNAPSHOT
[facility] Emergency Department    [address] [place] [address]    Phone:  [phone]    Fax:  [phone]                                       Cole Pina   MRN: [id number]    Department:  [facility] Emergency Department   Date of Visit:  12/8/2017           After Visit Summary Signature Page     I have received my discharge instructions, and my questions have been answered. I have discussed any challenges I see with this plan with the nurse or doctor.    ..........................................................................................................................................  Patient/Patient Representative Signature      ..........................................................................................................................................  Patient Representative Print Name and Relationship to Patient    ..................................................               ................................................  Date                                            Time    ..........................................................................................................................................  Reviewed by Signature/Title    ...................................................              ..............................................  Date                                                            Time

## 2017-12-08 NOTE — LETTER
[facility]  Emergency Room  [address] [place]   [address]  Tel: [phone]   Fax: [phone]  2017    Cole Pina  [address] [place] [address]  [phone] (home)     : 1995          To Whom it May Concern:    Cole Pina was seen in our ER today, 2017. I expect her condition to improve over the next 3 days.  She will miss work today and may return to work without restriction, when improved.      Please contact me for questions or concerns.    Sincerely,      Watson Calvin MD

## 2017-12-08 NOTE — DISCHARGE INSTRUCTIONS
Please follow up with your therapist within the next week.    Please resume the Zoloft and take this daily.    Contact your therapist and crisis line as needed.    Return to the Emergency Department at any time if your symptoms worsen.
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
## 2018-01-17 NOTE — PROGRESS NOTES
SUBJECTIVE:                                                    Cole Pina is a 22 year old female who presents to clinic today for the following health issues:      History of Present Illness     Depression & Anxiety Follow-up:     Depression/Anxiety:  Depression only    Status since last visit::  Stable    Other associated symptoms of depression::  YES    Significant life event::  YES    Current substance use::  None    Anxiety/Panic symptoms::  No    Answers for HPI/ROS submitted by the patient on 1/19/2018   If you checked off any problems, how difficult have these problems made it for you to do your work, take care of things at home, or get along with other people?: Very difficult  PHQ9 TOTAL SCORE: 10  JUJU 7 TOTAL SCORE: 3      Problem list and histories reviewed & adjusted, as indicated.  Additional history: as documented        Patient Active Problem List   Diagnosis     Encounter for initial prescription of contraceptive pills     Papanicolaou smear of cervix with low grade squamous intraepithelial lesion (LGSIL)     Severe single current episode of major depressive disorder, without psychotic features (H)     JUJU (generalized anxiety disorder)     Past Surgical History:   Procedure Laterality Date     HERNIA REPAIR         Social History   Substance Use Topics     Smoking status: Smoker, Current Status Unknown     Types: Cigarettes     Smokeless tobacco: Never Used     Alcohol use Yes     Family History   Problem Relation Age of Onset     Anemia Mother      Anemia Maternal Grandmother          Current Outpatient Prescriptions   Medication Sig Dispense Refill     citalopram (CELEXA) 20 MG tablet Take 1/2 tablet (10 mg) for 1-2 weeks, then increase to 1 tablet orally daily 30 tablet 0     buPROPion (WELLBUTRIN SR) 150 MG 12 hr tablet Take 1 tablet once daily and increase to 1 tablet twice daily after 4 to 7 days 60 tablet 2     norethindrone-ethinyl estradiol (JUNEL FE 1/20) 1-20 MG-MCG per tablet Take 1  "tablet by mouth daily 84 tablet 3     sertraline (ZOLOFT) 50 MG tablet Take 1.5 pills at bedtime (Patient not taking: Reported on 1/19/2018) 135 tablet 0     Prenatal Vit-Fe Fumarate-FA (PRENATAL MULTIVITAMIN  PLUS IRON) 27-0.8 MG TABS per tablet Take 1 tablet by mouth daily       No Known Allergies  BP Readings from Last 3 Encounters:   01/19/18 106/60   12/08/17 112/70   10/05/17 98/62    Wt Readings from Last 3 Encounters:   01/19/18 102 lb (46.3 kg)   10/05/17 109 lb (49.4 kg)   05/30/17 116 lb 8 oz (52.8 kg)                  Labs reviewed in EPIC        ROS:  Constitutional, HEENT, cardiovascular, pulmonary, GI, , musculoskeletal, neuro, skin, endocrine and psych systems are negative, except as otherwise noted.      OBJECTIVE:   /60 (BP Location: Left arm, Patient Position: Chair, Cuff Size: Adult Small)  Pulse 83  Temp 98  F (36.7  C) (Oral)  Resp 16  Ht 5' 2.99\" (1.6 m)  Wt 102 lb (46.3 kg)  SpO2 100%  BMI 18.07 kg/m2  Body mass index is 18.07 kg/(m^2).   Physical Exam   Constitutional: She is oriented to person, place, and time. She appears well-developed and well-nourished.   HENT:   Head: Normocephalic and atraumatic.   Eyes: EOM are normal.   Neck: Neck supple.   Cardiovascular: Normal rate, regular rhythm, normal heart sounds and intact distal pulses.  Exam reveals no gallop.    No murmur heard.  Neurological: She is alert and oriented to person, place, and time.   Psychiatric: Her behavior is normal. Judgment and thought content normal.   Tearful.  Low mood.         Diagnostic Test Results:  none     ASSESSMENT/PLAN:     Problem List Items Addressed This Visit     Severe single current episode of major depressive disorder, without psychotic features (H)     Acute worsening of her symptoms as she stopped taking Zoloft attributing it to all the stress she has been going through.  I do not think she developed any side effects on the Zoloft however she does not wish to go back on it.  I think " it is very important for her to stay on an SSRI given her depression.  She also wishes to be on a medication that would help her focus.  She does not have any history of ADHD in the past and neither does she endorse any symptoms related to ADHD.  I think her symptoms are more related to severe depression that is uncontrolled.  I talked about starting Celexa and adding Wellbutrin to help with her symptoms.  She will try this and return to clinic in 1 month for close follow-up.  She will be soon seeing a counselor at [facility] for therapy.  Highly encouraged patient to keep these appointments.         Relevant Medications    citalopram (CELEXA) 20 MG tablet    buPROPion (WELLBUTRIN SR) 150 MG 12 hr tablet      Other Visit Diagnoses     Moderate episode of recurrent major depressive disorder (H)    -  Primary    Relevant Medications    citalopram (CELEXA) 20 MG tablet    buPROPion (WELLBUTRIN SR) 150 MG 12 hr tablet         Myra Harkins MD  [facility]

## 2018-01-19 ENCOUNTER — TELEPHONE (OUTPATIENT)
Dept: FAMILY MEDICINE | Facility: OTHER | Age: 23
End: 2018-01-19

## 2018-01-19 ENCOUNTER — OFFICE VISIT (OUTPATIENT)
Dept: FAMILY MEDICINE | Facility: OTHER | Age: 23
End: 2018-01-19
Payer: COMMERCIAL

## 2018-01-19 VITALS
OXYGEN SATURATION: 100 % | RESPIRATION RATE: 16 BRPM | SYSTOLIC BLOOD PRESSURE: 106 MMHG | HEART RATE: 83 BPM | DIASTOLIC BLOOD PRESSURE: 60 MMHG | BODY MASS INDEX: 18.07 KG/M2 | WEIGHT: 102 LBS | TEMPERATURE: 98 F | HEIGHT: 63 IN

## 2018-01-19 DIAGNOSIS — F32.2 SEVERE SINGLE CURRENT EPISODE OF MAJOR DEPRESSIVE DISORDER, WITHOUT PSYCHOTIC FEATURES (H): ICD-10-CM

## 2018-01-19 DIAGNOSIS — F33.1 MODERATE EPISODE OF RECURRENT MAJOR DEPRESSIVE DISORDER (H): Primary | ICD-10-CM

## 2018-01-19 PROCEDURE — 99213 OFFICE O/P EST LOW 20 MIN: CPT | Performed by: FAMILY MEDICINE

## 2018-01-19 RX ORDER — BUPROPION HYDROCHLORIDE 150 MG/1
TABLET, EXTENDED RELEASE ORAL
Qty: 60 TABLET | Refills: 2 | Status: SHIPPED | OUTPATIENT
Start: 2018-01-19 | End: 2021-09-14 | Stop reason: SINTOL

## 2018-01-19 RX ORDER — CITALOPRAM HYDROBROMIDE 20 MG/1
TABLET ORAL
Qty: 30 TABLET | Refills: 0 | Status: SHIPPED | OUTPATIENT
Start: 2018-01-19 | End: 2018-02-19

## 2018-01-19 ASSESSMENT — ANXIETY QUESTIONNAIRES
5. BEING SO RESTLESS THAT IT IS HARD TO SIT STILL: NOT AT ALL
GAD7 TOTAL SCORE: 3
2. NOT BEING ABLE TO STOP OR CONTROL WORRYING: NOT AT ALL
3. WORRYING TOO MUCH ABOUT DIFFERENT THINGS: NOT AT ALL
1. FEELING NERVOUS, ANXIOUS, OR ON EDGE: SEVERAL DAYS
7. FEELING AFRAID AS IF SOMETHING AWFUL MIGHT HAPPEN: NOT AT ALL
GAD7 TOTAL SCORE: 3
7. FEELING AFRAID AS IF SOMETHING AWFUL MIGHT HAPPEN: NOT AT ALL
6. BECOMING EASILY ANNOYED OR IRRITABLE: SEVERAL DAYS
4. TROUBLE RELAXING: SEVERAL DAYS
GAD7 TOTAL SCORE: 3

## 2018-01-19 ASSESSMENT — PATIENT HEALTH QUESTIONNAIRE - PHQ9
SUM OF ALL RESPONSES TO PHQ QUESTIONS 1-9: 10
10. IF YOU CHECKED OFF ANY PROBLEMS, HOW DIFFICULT HAVE THESE PROBLEMS MADE IT FOR YOU TO DO YOUR WORK, TAKE CARE OF THINGS AT HOME, OR GET ALONG WITH OTHER PEOPLE: VERY DIFFICULT
SUM OF ALL RESPONSES TO PHQ QUESTIONS 1-9: 10

## 2018-01-19 ASSESSMENT — PAIN SCALES - GENERAL: PAINLEVEL: NO PAIN (0)

## 2018-01-19 NOTE — LETTER
My Depression Action Plan  Name: Cole Pina   Date of Birth 1995  Date: 1/17/2018    My doctor: Myra Harkins   My clinic: [address]  [facility] [address] [phone]          GREEN    ZONE   Good Control    What it looks like:     Things are going generally well. You have normal up s and down s. You may even feel depressed from time to time, but bad moods usually last less than a day.   What you need to do:  1. Continue to care for yourself (see self care plan)  2. Check your depression survival kit and update it as needed  3. Follow your physician s recommendations including any medication.  4. Do not stop taking medication unless you consult with your physician first.           YELLOW         ZONE Getting Worse    What it looks like:     Depression is starting to interfere with your life.     It may be hard to get out of bed; you may be starting to isolate yourself from others.    Symptoms of depression are starting to last most all day and this has happened for several days.     You may have suicidal thoughts but they are not constant.   What you need to do:     1. Call your care team, your response to treatment will improve if you keep your care team informed of your progress. Yellow periods are signs an adjustment may need to be made.     2. Continue your self-care, even if you have to fake it!    3. Talk to someone in your support network    4. Open up your depression survival kit           RED    ZONE Medical Alert - Get Help    What it looks like:     Depression is seriously interfering with your life.     You may experience these or other symptoms: You can t get out of bed most days, can t work or engage in other necessary activities, you have trouble taking care of basic hygiene, or basic responsibilities, thoughts of suicide or death that will not go away, self-injurious behavior.     What you need to do:  1. Call your care team and  request a same-day appointment. If they are not available (weekends or after hours) call your local crisis line, emergency room or 911.      Electronically signed by: Aurelia Morfin, January 17, 2018    Depression Self Care Plan / Survival Kit    Self-Care for Depression  Here s the deal. Your body and mind are really not as separate as most people think.  What you do and think affects how you feel and how you feel influences what you do and think. This means if you do things that people who feel good do, it will help you feel better.  Sometimes this is all it takes.  There is also a place for medication and therapy depending on how severe your depression is, so be sure to consult with your medical provider and/ or Behavioral Health Consultant if your symptoms are worsening or not improving.     In order to better manage my stress, I will:    Exercise  Get some form of exercise, every day. This will help reduce pain and release endorphins, the  feel good  chemicals in your brain. This is almost as good as taking antidepressants!  This is not the same as joining a gym and then never going! (they count on that by the way ) It can be as simple as just going for a walk or doing some gardening, anything that will get you moving.      Hygiene   Maintain good hygiene (Get out of bed in the morning, Make your bed, Brush your teeth, Take a shower, and Get dressed like you were going to work, even if you are unemployed).  If your clothes don't fit try to get ones that do.    Diet  I will strive to eat foods that are good for me, drink plenty of water, and avoid excessive sugar, caffeine, alcohol, and other mood-altering substances.  Some foods that are helpful in depression are: complex carbohydrates, B vitamins, flaxseed, fish or fish oil, fresh fruits and vegetables.    Psychotherapy  I agree to participate in Individual Therapy (if recommended).    Medication  If prescribed medications, I agree to take them.  Missing doses  can result in serious side effects.  I understand that drinking alcohol, or other illicit drug use, may cause potential side effects.  I will not stop my medication abruptly without first discussing it with my provider.    Staying Connected With Others  I will stay in touch with my friends, family members, and my primary care provider/team.    Use your imagination  Be creative.  We all have a creative side; it doesn t matter if it s oil painting, sand castles, or mud pies! This will also kick up the endorphins.    Witness Beauty  (AKA stop and smell the roses) Take a look outside, even in mid-winter. Notice colors, textures. Watch the squirrels and birds.     Service to others  Be of service to others.  There is always someone else in need.  By helping others we can  get out of ourselves  and remember the really important things.  This also provides opportunities for practicing all the other parts of the program.    Humor  Laugh and be silly!  Adjust your TV habits for less news and crime-drama and more comedy.    Control your stress  Try breathing deep, massage therapy, biofeedback, and meditation. Find time to relax each day.     My support system    Clinic Contact:  Phone number:    Contact 1:  Phone number:    Contact 2:  Phone number:    Hoahaoism/:  Phone number:    Therapist:  Phone number:    Local crisis center:    Phone number:    Other community support:  Phone number:

## 2018-01-19 NOTE — MR AVS SNAPSHOT
"              After Visit Summary   1/19/2018    Cole Pina    MRN: [id number]           Patient Information     Date Of Birth          1995        Visit Information        Provider Department      1/19/2018 10:40 AM Myra Harkins MD [facility]        Today's Diagnoses     Moderate episode of recurrent major depressive disorder (H)    -  1    Screening examination for venereal disease        Screening for malignant neoplasm of cervix        Need for HPV vaccine        Need for prophylactic vaccination and inoculation against influenza        Need for prophylactic vaccination with tetanus-diphtheria (TD)        Severe single current episode of major depressive disorder, without psychotic features (H)           Follow-ups after your visit        Follow-up notes from your care team     Return in about 1 month (around 2/19/2018).      Who to contact     If you have questions or need follow up information about today's clinic visit or your schedule please contact [facility] directly at [phone].  Normal or non-critical lab and imaging results will be communicated to you by MyChart, letter or phone within 4 business days after the clinic has received the results. If you do not hear from us within 7 days, please contact the clinic through MyChart or phone. If you have a critical or abnormal lab result, we will notify you by phone as soon as possible.  Submit refill requests through [employer] or call your pharmacy and they will forward the refill request to us. Please allow 3 business days for your refill to be completed.          Additional Information About Your Visit        MyChart Information     [employer] lets you send messages to your doctor, view your test results, renew your prescriptions, schedule appointments and more. To sign up, go to [email or web address] . Click on \"Log in\" on the left side of the screen, which will take you to the Welcome page. Then click on " "\"Sign up Now\" on the right side of the page.     You will be asked to enter the access code listed below, as well as some personal information. Please follow the directions to create your username and password.     Your access code is: [email or web address]  Expires: 2018 11:17 AM     Your access code will  in 90 days. If you need help or a new code, please call your [facility] or [phone].        Care EveryWhere ID     This is your Care EveryWhere ID. This could be used by other organizations to access your [facility] medical records  [id number]        Your Vitals Were     Pulse Temperature Respirations Height Pulse Oximetry BMI (Body Mass Index)    83 98  F (36.7  C) (Oral) 16 5' 2.99\" (1.6 m) 100% 18.07 kg/m2       Blood Pressure from Last 3 Encounters:   18 106/60   17 112/70   10/05/17 98/62    Weight from Last 3 Encounters:   18 102 lb (46.3 kg)   10/05/17 109 lb (49.4 kg)   17 116 lb 8 oz (52.8 kg)              We Performed the Following     DEPRESSION ACTION PLAN (DAP)          Today's Medication Changes          These changes are accurate as of: 18 11:18 AM.  If you have any questions, ask your nurse or doctor.               Start taking these medicines.        Dose/Directions    buPROPion 150 MG 12 hr tablet   Commonly known as:  WELLBUTRIN SR   Used for:  Moderate episode of recurrent major depressive disorder (H)   Started by:  Myra Harkins MD        Take 1 tablet once daily and increase to 1 tablet twice daily after 4 to 7 days   Quantity:  60 tablet   Refills:  2       citalopram 20 MG tablet   Commonly known as:  celeXA   Used for:  Moderate episode of recurrent major depressive disorder (H)   Started by:  Myra Harkins MD        Take 1/2 tablet (10 mg) for 1-2 weeks, then increase to 1 tablet orally daily   Quantity:  30 tablet   Refills:  0            Where to get your medicines      [id number] [place] [facility] [place], [place] - [address] AT [facility] [address]  [address], [facility] [address]     Phone:  [phone]     buPROPion 150 MG 12 hr tablet    citalopram 20 MG tablet                Primary Care Provider Office Phone # Fax #    Myra Harkins [phone]       [address] [facility] [address]  [facility] [address]        Equal Access to Services     DAT SERRANO : Hadii aad ku hadasho Soomaali, waaxda luqadaha, qaybta kaalmada adeegyada, waxay idiin hayaan adeeg man lamerry . So [facility] [phone].    ATENCIÓN: Si habla español, tiene a ortiz disposición servicios gratuitos de asistencia lingüística. [facility] [phone].    We comply with applicable federal civil rights laws and [place] laws. We do not discriminate on the basis of race, color, national origin, age, disability, sex, sexual orientation, or gender identity.            Thank you!     Thank you for choosing [facility]  for your care. Our goal is always to provide you with excellent care. Hearing back from our patients is one way we can continue to improve our services. Please take a few minutes to complete the written survey that you may receive in the mail after your visit with us. Thank you!             Your Updated Medication List - Protect others around you: Learn how to safely use, store and throw away your medicines at www.disposemymeds.org.          This list is accurate as of: 1/19/18 11:18 AM.  Always use your most recent med list.                   Brand Name Dispense Instructions for use Diagnosis    buPROPion 150 MG 12 hr tablet    WELLBUTRIN SR    60 tablet    Take 1 tablet once daily and increase to 1 tablet twice daily after 4 to 7 days    Moderate episode of recurrent major depressive disorder (H)       citalopram 20 MG tablet    celeXA    30 tablet    Take 1/2 tablet (10 mg) for 1-2 weeks, then increase to 1 tablet orally daily    Moderate episode of recurrent major depressive disorder (H)        norethindrone-ethinyl estradiol 1-20 MG-MCG per tablet    JUNEL FE 1/20    84 tablet    Take 1 tablet by mouth daily    Encounter for initial prescription of contraceptive pills       prenatal multivitamin plus iron 27-0.8 MG Tabs per tablet      Take 1 tablet by mouth daily        sertraline 50 MG tablet    ZOLOFT    135 tablet    Take 1.5 pills at bedtime    Severe single current episode of major depressive disorder, without psychotic features (H)

## 2018-01-19 NOTE — ASSESSMENT & PLAN NOTE
Acute worsening of her symptoms as she stopped taking Zoloft attributing it to all the stress she has been going through.  I do not think she developed any side effects on the Zoloft however she does not wish to go back on it.  I think it is very important for her to stay on an SSRI given her depression.  She also wishes to be on a medication that would help her focus.  She does not have any history of ADHD in the past and neither does she endorse any symptoms related to ADHD.  I think her symptoms are more related to severe depression that is uncontrolled.  I talked about starting Celexa and adding Wellbutrin to help with her symptoms.  She will try this and return to clinic in 1 month for close follow-up.  She will be soon seeing a counselor at [facility] for therapy.  Highly encouraged patient to keep these appointments.

## 2018-01-19 NOTE — TELEPHONE ENCOUNTER
Cole Pina is a 22 year old female who calls with depression.    NURSING ASSESSMENT:  Description:  Seen in clinic today. Going through life stressors. Has been off medications for a while. Was prescribed new medications today. Had a huge fight with her boyfriends mother. Feeling very agitated. Feels like she has low self wealth. CPS has been involved with her child, child is with grandma and the boyfriend. Unsure if she has suicidal thoughts. Having a friend pick her up for the night. Feeling unwelcomed at her home. Has had suicidal thoughts when she was on Zoloft, back in December 2017. Denies suicidal thoughts, plan and a means.   Onset/duration:  today  Precip. factors:  Depression   Associated symptoms:  Stressed, crying   Improves/worsens symptoms:  Worsening since she left the clinic  Pain scale (0-10)   0/10  LMP/preg/breast feeding: No LMP recorded.  Last exam/Treatment:  01/19/2018  Allergies: No Known Allergies    NURSING PLAN: Nursing advice to patient to continue with providers plan today, gave [facility] Hotline number and encouraged [facility] to be contacted    RECOMMENDED DISPOSITION:  Per OV today will start Celexa and adding Wellbutrin to help with her symptoms.  She will try this and return to clinic in 1 month for close follow-up.  She will be soon seeing a counselor at [facility] for therapy.  Highly encouraged patient to keep these appointments.  RN gave National Suicidal Hotline number incase needed, encouraged to call the clinic anytime she needs to talk about her depression     Will comply with recommendation: Yes  If further questions/concerns or if symptoms do not improve, worsen or new symptoms develop, call your PCP or [facility] as soon as possible.    NOTES:  Disposition was determined by the first positive assessment question, therefore all previous assessment questions were negative    Guideline used:  Telephone Triage Protocols for Nurses, Fifth Edition, Nadine  Kyle  Depression   Nursing Judgment  Informed RK     Lizz Georges, RN, BSN

## 2018-01-19 NOTE — NURSING NOTE
"Chief Complaint   Patient presents with     Behavioral Problem     ADHD evaluation     Panel Management     Flu, HPV, Tdap, Pap, Chlamydia, PHQ/JUJU/DAP       Initial /60 (BP Location: Left arm, Patient Position: Chair, Cuff Size: Adult Small)  Pulse 83  Temp 98  F (36.7  C) (Oral)  Resp 16  Ht 5' 2.99\" (1.6 m)  Wt 102 lb (46.3 kg)  SpO2 100%  BMI 18.07 kg/m2 Estimated body mass index is 18.07 kg/(m^2) as calculated from the following:    Height as of this encounter: 5' 2.99\" (1.6 m).    Weight as of this encounter: 102 lb (46.3 kg).  Medication Reconciliation: complete   Amy Stubbs CMA (AAMA)      "

## 2018-01-21 ENCOUNTER — HEALTH MAINTENANCE LETTER (OUTPATIENT)
Age: 23
End: 2018-01-21

## 2018-01-23 ASSESSMENT — PATIENT HEALTH QUESTIONNAIRE - PHQ9: SUM OF ALL RESPONSES TO PHQ QUESTIONS 1-9: 10

## 2018-01-23 ASSESSMENT — ANXIETY QUESTIONNAIRES: GAD7 TOTAL SCORE: 3

## 2018-02-19 DIAGNOSIS — F33.1 MODERATE EPISODE OF RECURRENT MAJOR DEPRESSIVE DISORDER (H): ICD-10-CM

## 2018-02-20 RX ORDER — CITALOPRAM HYDROBROMIDE 20 MG/1
20 TABLET ORAL DAILY
Qty: 30 TABLET | Refills: 0 | Status: SHIPPED | OUTPATIENT
Start: 2018-02-20 | End: 2021-09-14 | Stop reason: SINTOL

## 2018-02-20 NOTE — TELEPHONE ENCOUNTER
Routing refill request to provider for review/approval because:  Pt was just started on Celexa on 1/19/18 and advised to f/u in clinic in one month but has not done this.      Sima Daily RN

## 2018-04-04 ENCOUNTER — TELEPHONE (OUTPATIENT)
Dept: FAMILY MEDICINE | Facility: OTHER | Age: 23
End: 2018-04-04

## 2018-04-04 NOTE — TELEPHONE ENCOUNTER
summary:    Patient is due/failing the following:   FOLLOW UP, PAP and PHQ9    Action needed:   Patient needs office visit for Depression follow up and PAP. and Patient needs to do PHQ9.    Type of outreach:    Phone, left message for patient to call back.     Questions for provider review:    None                                                                                                                                    Regi Alexusnayeli     Chart routed to Care Team .        Panel Management Review      Patient has the following on her problem list:     Depression / Dysthymia review    Measure:  Needs PHQ-9 score of 4 or less during index window.  Administer PHQ-9 and if score is 5 or more, send encounter to provider for next steps.        PHQ-9 SCORE 10/5/2017 11/7/2017 1/19/2018   Total Score MyChart 17 (Moderately severe depression) - 10 (Moderate depression)   Total Score 17 2 10       If PHQ-9 recheck is 5 or more, route to provider for next steps.    Patient is due for:  PHQ9      Composite cancer screening  Chart review shows that this patient is due/due soon for the following Pap Smear

## 2018-04-04 NOTE — LETTER
[address]   [facility] [address]  Phone: [phone]  May 2, 2018      Cole Pina  [place] [address]      Dear Cole,    We care about your health and have reviewed your health plan including your medical conditions, medications, and lab results.  Based on this review, it is recommended that you follow up regarding the following health topic(s):  -Depression  -Cervical Cancer Screening    We recommend you take the following action(s):  -schedule a FOLLOWUP APPOINTMENT.  -schedule a PAP SMEAR EXAM which is due.  Please disregard this reminder if you have had this exam elsewhere within the last year.  It would be helpful for us to have a copy of your recent pap smear report to update your records.  -Complete and return the attached PHQ-9 Form.  If your total score is greater than 9, please schedule a followup appointment.  If you answer Yes to question 9, call your clinic between the hours of 8 to 5.  You may also call the Suicide Hotline at 8-835-162-EYCC (0178) any time.     Please call us at the [facility] - [phone] (or use [employer]) to address the above recommendations.     Thank you for trusting [facility] and we appreciate the opportunity to serve you.  We look forward to supporting your healthcare needs in the future.    Healthy Regards,    Your Health Care Team  [facility]

## 2019-06-07 DIAGNOSIS — F33.1 MAJOR DEPRESSIVE DISORDER, RECURRENT EPISODE, MODERATE (H): ICD-10-CM

## 2019-06-07 DIAGNOSIS — F34.1 EARLY ONSET DYSTHYMIA: Primary | ICD-10-CM

## 2021-06-27 ENCOUNTER — OFFICE VISIT (OUTPATIENT)
Dept: URGENT CARE | Facility: URGENT CARE | Age: 26
End: 2021-06-27
Payer: COMMERCIAL

## 2021-06-27 VITALS
BODY MASS INDEX: 22.15 KG/M2 | DIASTOLIC BLOOD PRESSURE: 80 MMHG | SYSTOLIC BLOOD PRESSURE: 131 MMHG | HEART RATE: 77 BPM | TEMPERATURE: 98.9 F | OXYGEN SATURATION: 100 % | WEIGHT: 125 LBS

## 2021-06-27 DIAGNOSIS — L73.9 FOLLICULITIS: Primary | ICD-10-CM

## 2021-06-27 PROCEDURE — 99203 OFFICE O/P NEW LOW 30 MIN: CPT | Performed by: PHYSICIAN ASSISTANT

## 2021-06-27 NOTE — PATIENT INSTRUCTIONS
Patient Education     Understanding Folliculitis  Folliculitis is when hair follicles become inflamed. Follicles are the tiny holes from which hair grows out of your skin. This skin condition can occur any place on the body where hair grows. But it s often found on the neck, face, and scalp.    How to say it  [id number]  What causes folliculitis?  An infection or irritation can cause this skin condition. Infectious folliculitis is usually caused by Staph aureus. But it may also be caused by other bacteria or fungi. The condition can also happen from a wound or irritation of the skin. Shaving is a common cause. Some cases may come from taking certain medicines, such as those that treat acne.   Symptoms of folliculitis  This skin condition tends to develop quickly. It looks like little pimples on a base of a red, inflamed hair follicles. These bumps may ooze pus. They may also be:     Itchy    Painful    Red    Swollen  Treatment for folliculitis  Treatment depends on the cause of the inflammation. In some cases, this skin condition will go away on its own in a few days. But it may return. Treatment options include:     Warm compress. Putting a warm, wet washcloth on the inflamed skin may help. Don't reuse the same washcloth, because that may spread infection.    Medicine. Many skin (topical) and oral medicines are available. Antibiotics are used for bacterial infections. Antifungal medicines are best for fungal infections.    Good hygiene. Keeping the skin clean can help. Use a clean razor when shaving. Prevent ingrown hairs after shaving. This can reduce folliculitis in the beard area. Avoid any substances that bother your skin.  When to call your healthcare provider  Call your healthcare provider right away if you have any of these:    Fever of 100.4 F (38 C) or higher, or as directed by your healthcare provider    Pain that gets worse    Symptoms that don t get better, or get worse    New  symptoms  [employer] last reviewed this educational content on 6/1/2019 2000-2021 [employer]. All rights reserved. This information is not intended as a substitute for professional medical care. Always follow your healthcare professional's instructions.

## 2021-06-27 NOTE — PROGRESS NOTES
SUBJECTIVE:   Cole Pina is a 26 year old female presenting with a chief complaint of   Chief Complaint   Patient presents with     Urgent Care     Vaginal Problem     c/o vaginal rash for 1 month after wax treatment, c/o leg swollen for 3 months       She is a new patient of [facility].  Patient presents with complaints of (1) perineal irritation following a waxing.  Patient had a waxing a month ago, 2 days later a rash.  No abnormal discharge.  Rash is irritation.      (2) bilateral leg swelling and feet being cold.      Treatment:  Tea tree oil, hydrocortisone and baths.      PMHx:  None  Medications:  BCP and vitamins  Surgeries:  None  Allergies:  none      Review of Systems   Skin: Positive for rash.   All other systems reviewed and are negative.      Past Medical History:   Diagnosis Date     Anemia      Papanicolaou smear of cervix with low grade squamous intraepithelial lesion (LGSIL) 8/23/2016     Family History   Problem Relation Age of Onset     Anemia Mother      Anemia Maternal Grandmother      Current Outpatient Medications   Medication Sig Dispense Refill     norethindrone-ethinyl estradiol (JUNEL FE 1/20) 1-20 MG-MCG per tablet Take 1 tablet by mouth daily 84 tablet 3     buPROPion (WELLBUTRIN SR) 150 MG 12 hr tablet Take 1 tablet once daily and increase to 1 tablet twice daily after 4 to 7 days (Patient not taking: Reported on 6/27/2021) 60 tablet 2     citalopram (CELEXA) 20 MG tablet Take 1 tablet (20 mg) by mouth daily (Patient not taking: Reported on 6/27/2021) 30 tablet 0     Prenatal Vit-Fe Fumarate-FA (PRENATAL MULTIVITAMIN  PLUS IRON) 27-0.8 MG TABS per tablet Take 1 tablet by mouth daily       sertraline (ZOLOFT) 50 MG tablet Take 1.5 pills at bedtime (Patient not taking: Reported on 1/19/2018) 135 tablet 0     Social History     Tobacco Use     Smoking status: Smoker, Current Status Unknown     Types: Cigarettes     Smokeless tobacco: Never Used   Substance Use Topics     Alcohol use:  Yes       OBJECTIVE  /80   Pulse 77   Temp 98.9  F (37.2  C) (Tympanic)   Wt 56.7 kg (125 lb)   LMP 06/27/2021   SpO2 100%   BMI 22.15 kg/m      Physical Exam  Vitals signs and nursing note reviewed.   Constitutional:       Appearance: Normal appearance. She is normal weight.   Eyes:      Extraocular Movements: Extraocular movements intact.      Conjunctiva/sclera: Conjunctivae normal.   Cardiovascular:      Rate and Rhythm: Normal rate.   Musculoskeletal: Normal range of motion.      Comments: Bilateral lower extremities with DP present, negative olivier sign bilaterally, no rashes, no tenderness, no pitting edema, full ROM at digits, ankles and knees.  Extremities warm and dry.     Skin:     General: Skin is warm and dry.      Comments: Suprapubic area with small papules, no erythema or discharge.     Neurological:      General: No focal deficit present.      Mental Status: She is alert.   Psychiatric:         Mood and Affect: Mood normal.         Behavior: Behavior normal.         Labs:  No results found for this or any previous visit (from the past 24 hour(s)).    X-Ray was not done.    ASSESSMENT:    No diagnosis found.     Medical Decision Making:    Differential Diagnosis:  folliculitis    Serious Comorbid Conditions:  Adult:  None    PLAN:    rx for neosporin BID.  Patient education.  Recommended patient f/u with PCP for further appropriate workup regarding patient's perception of leg edema.      Followup:    If not improving or if condition worsens, follow up with your Primary Care Provider, If not improving or if conditions worsens over the next 12-24 hours, go to the Emergency Department    There are no Patient Instructions on file for this visit.

## 2021-09-14 ENCOUNTER — VIRTUAL VISIT (OUTPATIENT)
Dept: FAMILY MEDICINE | Facility: CLINIC | Age: 26
End: 2021-09-14
Payer: COMMERCIAL

## 2021-09-14 DIAGNOSIS — F32.2 SEVERE SINGLE CURRENT EPISODE OF MAJOR DEPRESSIVE DISORDER, WITHOUT PSYCHOTIC FEATURES (H): Primary | ICD-10-CM

## 2021-09-14 DIAGNOSIS — F31.81 BIPOLAR 2 DISORDER (H): ICD-10-CM

## 2021-09-14 DIAGNOSIS — F41.1 GAD (GENERALIZED ANXIETY DISORDER): ICD-10-CM

## 2021-09-14 PROCEDURE — 96127 BRIEF EMOTIONAL/BEHAV ASSMT: CPT | Performed by: NURSE PRACTITIONER

## 2021-09-14 PROCEDURE — 99214 OFFICE O/P EST MOD 30 MIN: CPT | Mod: TEL | Performed by: NURSE PRACTITIONER

## 2021-09-14 RX ORDER — BUSPIRONE HYDROCHLORIDE 5 MG/1
TABLET ORAL
Qty: 90 TABLET | Refills: 0 | Status: SHIPPED | OUTPATIENT
Start: 2021-09-14

## 2021-09-14 RX ORDER — VENLAFAXINE 75 MG/1
75 TABLET ORAL 2 TIMES DAILY
Qty: 60 TABLET | Refills: 1 | Status: SHIPPED | OUTPATIENT
Start: 2021-09-14

## 2021-09-14 ASSESSMENT — ANXIETY QUESTIONNAIRES
2. NOT BEING ABLE TO STOP OR CONTROL WORRYING: MORE THAN HALF THE DAYS
3. WORRYING TOO MUCH ABOUT DIFFERENT THINGS: NEARLY EVERY DAY
6. BECOMING EASILY ANNOYED OR IRRITABLE: NEARLY EVERY DAY
GAD7 TOTAL SCORE: 18
5. BEING SO RESTLESS THAT IT IS HARD TO SIT STILL: NEARLY EVERY DAY
7. FEELING AFRAID AS IF SOMETHING AWFUL MIGHT HAPPEN: MORE THAN HALF THE DAYS
1. FEELING NERVOUS, ANXIOUS, OR ON EDGE: MORE THAN HALF THE DAYS
IF YOU CHECKED OFF ANY PROBLEMS ON THIS QUESTIONNAIRE, HOW DIFFICULT HAVE THESE PROBLEMS MADE IT FOR YOU TO DO YOUR WORK, TAKE CARE OF THINGS AT HOME, OR GET ALONG WITH OTHER PEOPLE: VERY DIFFICULT

## 2021-09-14 ASSESSMENT — PATIENT HEALTH QUESTIONNAIRE - PHQ9
SUM OF ALL RESPONSES TO PHQ QUESTIONS 1-9: 18
5. POOR APPETITE OR OVEREATING: NEARLY EVERY DAY

## 2021-09-14 NOTE — PATIENT INSTRUCTIONS
If you are breastfeeding, you should not be taking the buspar    Follow up with provider, virtual visit okay in 2 weeks    [place] suicide prevention number:  [phone] ([phone])    [facility]: [phone]    Go to the ER if you develop plan to hurt yourself

## 2021-09-14 NOTE — PROGRESS NOTES
Cole is a 26 year old who is being evaluated via a billable telephone visit.      What phone number would you like to be contacted at? cell  How would you like to obtain your AVS? Mail a copy    Assessment & Plan     Severe single current episode of major depressive disorder, without psychotic features (H)  PQH-9 high, no thoughts of hurting herself, no plan. Goes to therapy twice a week, doesn't have good support system other than that.   - venlafaxine (EFFEXOR) 75 MG tablet; Take 1 tablet (75 mg) by mouth 2 times daily    JUJU (generalized anxiety disorder)  Lots of anxiety, open to PRN medication also.   - venlafaxine (EFFEXOR) 75 MG tablet; Take 1 tablet (75 mg) by mouth 2 times daily  - busPIRone (BUSPAR) 5 MG tablet; Take 5 mg 1-3 times daily as needed for anxiety. May increase to 10 mg 1-3 times a day as needed    Bipolar 2 disorder (H)  New dx by therapist    Tobacco Cessation:   reports that she has been smoking cigarettes. She has never used smokeless tobacco.    Return in about 2 weeks (around 9/28/2021) for Medication check, Phone or Video visit okay.    DANAE Saba, NP-C  [facility]   Cole is a 26 year old who presents for the following health issues  accompanied by her self:    HPI         Hx of anxiety. Struggling with long time. Does have panic attacks, had one earlier this year. Her legs have been stiff from months, feels likely from stress and tension and anxiety.     Has been in therapy since May. Therapist has been dx with bipolar type 2.     Was on sertraline, celexa and wellbutrin. Felt she had side effects.     No thoughts of hurting herself or plan to hurt herself.     Doesn't have good support system. Does go to therapy . Has been isolating herself, doesn't feel those around her has been supportive for mental health or her other health issues.         Review of Systems   Constitutional, HEENT, cardiovascular, pulmonary, gi and gu  Psych as  above, systems are negative, except as otherwise noted.      Objective           Vitals:  No vitals were obtained today due to virtual visit.    Physical Exam   healthy, alert and no distress  PSYCH: Alert and oriented times 3; coherent speech, normal   rate and volume, able to articulate logical thoughts, able   to abstract reason, no tangential thoughts, no hallucinations   or delusions  Her affect is normal  RESP: No cough, no audible wheezing, able to talk in full sentences  Remainder of exam unable to be completed due to telephone visits    n/a          Phone call duration: 18 minutes

## 2021-09-15 ASSESSMENT — ANXIETY QUESTIONNAIRES: GAD7 TOTAL SCORE: 18
